# Patient Record
(demographics unavailable — no encounter records)

---

## 2017-12-26 NOTE — ONCOLOGY FOLLOW UP NOTE
EVENT DATE: December 22, 2017



DIAGNOSES

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 
thrombocytopenic purpura.



CHIEF COMPLAINT

Patient is here today for followup of her thrombocytopenia with pregnancy.



HEMATOLOGY HISTORY

Patient is a 28-year-old female who is currently pregnancy in her 15th week of 
gestation.  Patient moved from [place] to [place], [place], and she got 
pregnant and [facility], where she did have blood count on 
September 29, 2017, which showed normal white count of 5440, normal hemoglobin 
at 14.6, and hematocrit at 41.7%, but her platelet count was 84,000.  Repeat 
CBC for the platelets done on October 2, 2017 showed platelet count of 107,000.
  Repeat CBC showed white count 6.8, hemoglobin 13.8, hematocrit 39.7 and the 
platelet count 161,000.  Haptoglobin was normal at 52, total bilirubin was 
normal at 1.2, LDH was normal at 405, ruling out the possibility of hemolytic 
anemia.  B12 level was normal at 470 and the methylmalonic acid assay was 
normal at 0.16.  Serum folate was more than 22.3 and red cell folate was normal 
at 1241.  Platelet associated antibodies came back strongly positive for 
platelet antibodies direct IgG.



HISTORY OF PRESENT ILLNESS

The patient is here today for followup of her thrombocytopenia with pregnancy.  
She is doing fine currently.  She has some nausea and vomiting related to her 
pregnancy.  She has also some cough with expectoration and shortness of breath.
  She is weak, tired and fatigued, but generally, she is stable.  



PAST MEDICAL HISTORY

Insignificant except for premature uterine contractions with her last pregnancy 
9 months ago.



PAST SURGICAL HISTORY

1.  D&C for a miscarriage.

2.  [place] tooth extraction.



FAMILY HISTORY

Mother had breast cancer, maternal grandmother had stomach and lung cancer.



SOCIAL HISTORY

Patient is  with 2 biological children.  She is a homemaker currently.  
She quit working after she got pregnant.  She smoked for 5 years around half a 
pack a day, but she quit 4 years ago.  Denies any abuse of alcohol or illicit 
drugs.



CURRENT MEDICATIONS

1.  Prenatal vitamins.

2.  Folic acid.

3.  Fish oil.



ALLERGIES

No known drug allergies.



REVIEW OF SYSTEMS

CONSTITUTIONAL:  She has some chills but because of the cold weather.  

HEENT:

Ears:  No tinnitus or hearing problem.

Nose:  She has epistaxis.

Throat:  No sore throat or mouth ulcers.

Eyes:  No diplopia or visual changes.

RESPIRATORY:  She has cough with shortness of breath.

CARDIOVASCULAR:  No chest pain, orthopnea, or paroxysmal nocturnal dyspnea (PND)
.  No edema.  No palpitations.

GASTROINTESTINAL:  She has occasional nausea and vomiting.  

GENITOURINARY:  No hematuria or dysuria.

MUSCULOSKELETAL:  No pain in the muscles, joints or bones.

NEUROLOGICAL:  She has occasional headache.

HEMATOLOGIC/LYMPHATIC:  She is weak, tired, and fatigued.  

SKIN:  No skin rash or lumps.

PSYCHIATRIC:  No anxiety or depression.



PHYSICAL EXAMINATION

GENERAL:  Looks stable.  Well-developed, well-nourished, and in no acute 
distress.

VITAL SIGNS:  Blood pressure 115/72, pulse 112 per minute, respirations 16 per 
minute, temperature 96.6, pulse ox 92% on room air.

HEENT:

Head:  Atraumatic.  No sinus tenderness to palpation.

Eyes:  No icterus or conjunctivitis.

Mouth and throat:  No oral thrush or mucositis.

NECK:  Supple.  No cervical or supraclavicular lymphadenopathy.

LUNGS:  Clear to auscultation and percussion bilaterally.

HEART:  Regular rate and rhythm.  No gallops, murmurs, clicks or rubs.

ABDOMEN:  Soft and lax.  No tenderness.  No hepatosplenomegaly.  No masses.

EXTREMITIES:  No cyanosis, clubbing or edema.

LYMPHATICS:  No peripheral lymphadenopathy.

NEUROLOGICAL:  Conscious, alert and oriented times three.  No focal motor or 
sensory deficits.

PSYCHIATRIC:  Mood and affect appear normal.

SKIN:  No skin rash, bruise or purpuric eruption.



DIAGNOSTIC DATA

CBC showed white count 8.4, hemoglobin 13, hematocrit 37.8, platelets 162,000.  



ASSESSMENT

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 
thrombocytopenic purpura given that her thrombocytopenia occurs during her late 
first trimester or the beginning of the second trimester.  Platelet associated 
antibodies were strongly positive for platelet antibodies, direct IgG, B12, 
folate, red cell folate, methylmalonic acid assay, all came back within the 
normal range.  There is no evidence of hemolysis by normal haptoglobin.  Her LDH
, direct bilirubin all came back within the normal range ruling out the 
possibility of microangiopathy with pregnancy.  Her current platelet count is 
162,000, which is normal and stable, and no further workup or intervention is 
required.  Will continue to monitor her CBC every month and I will see her 
again in 3 months with CBC at that time.  



PLAN

1.  Continue followup.

2.  CBC to be checked monthly.

3.  Patient to return in 3 months with CBC.

4.  The patient to contact us for any new concerns or complaints. 
NED

## 2018-02-16 NOTE — OB/GYN PROGRESS NOTE
OB Subjective


Progress Notes


Subjective


Patient reports feels pain improved with ice pack to low back and low abdomen.  

No vaginal bleeding, no contractions.  Good fetal movement.


GI:  NEG Nausea, NEG Vomiting, NEG Flatus, NEG Bowel Movement


:  Voiding Well


Pain:  Mild, Not Requiring Pain Meds


Eyes:  No Visual Disturbances





OB Objective


Physical Exam





Vital Signs








  Date Time  Temp Pulse Resp B/P (MAP) Pulse Ox O2 Delivery O2 Flow Rate FiO2


 


18 19:04 98.2 102 18 107/57 (74) 95 Room Air  








General Appearance:  Alert/Awake/No Acute Distress


Neurological:  No Gross deficits


Eyes:  Normal Extraocular Movement & Vison


Respiratory:  Clear to Auscultation


Abdomen:  Soft, Non-Tender, Non-Distended (fundus non-tender, some diffuse 

tenderness)


Extremities:  No Cyanosis,Clubbing or Edema


Integumentary:  Skin Intact without Lesions or Rash


Psychological:  Alert & Oriented X3, Appropriate Mood & Affect


Result Diagram:  


18 1940





fibrinogen 325


Imaging


OB ultrasound:


EFW 1994g, 9%ile


JOSE LUIS normal at 18.4


anterior fundal placenta, no e/o acute abruption





Assessment and Plan


Problems:  


(1) Abdominal pain affecting pregnancy, antepartum


Status:  Acute


Assessment & Plan:  29yo  at 34.1 with known IUGR at 7%ile, and known 

suspected chronic abruption, now with abdominal pain s/p fall.  Fetal testing 

remains reassuring on NST, no contractile activity, no uterine tenderness.  CBC 

overall good, platelets low but stable at 145 (152 12/12).  Fibrinogen 325.  

Ultrasound shows stable fetal growth, reassuring JOSE LUIS 18, no e/o acute 

abruption.  Pain improved with ice packs to area.  Will allow discharge home 

with trial of cyclobenzaprine, as mechanism of injury was falling backward with 

an acute pulling sensation.  Continue intermittent ice to area.  Strict 

instructions to patient to call us with contractions or any vaginal bleeding or 

decreased fetal movement.





(2) Status post fall


Status:  Acute











VINH GODFREY MD 2018 21:40

## 2018-02-16 NOTE — RADIOLOGY IMAGING REPORT
FACILITY: [facility] 

 

PATIENT NAME: Renate Wilson

: 1989

MR: [id number]

V: [id number]

EXAM DATE: 

ORDERING PHYSICIAN: VINH GODFREY

TECHNOLOGIST: 

 

Location: [facility]

Patient: Renate Wilson

: 1989

MRN: [id number]

Visit/Account:[id number]

Date of Sevice:  2018

 

ACCESSION #: [id number]

 

OB LIMITED

 

HISTORY: Patient fell on her back. Pain.

 

COMPARISON STUDIES:  None.

 

FINDINGS:

Intrauterine gestations: One.

Fetal presentation: Cephalic.

Fetal heart rate: Regular at 142-149 bpm.

Amniotic fluid index: 18.4 cm.

Largest amniotic fluid pocket 6.2 cm.

 

Placenta: Anterior without previa. Placental cord insertion is normal. The sonographer was worried ab
out a small hypoechoic area visible only in the transverse plane deep to the right side of the placen
ta (images 26 through 29). It measures 1.3 x 0.8 cm. The appearance favors that it is a portion of th
e uterine wall. A tiny chronic placental abruption is felt to be much less likely. No ultrasound evid
ence for acute abruption.

Uterus: Gravid, otherwise normal.

Maternal adnexa: Unremarkable.

Cervix: Not visible due to shadowing from the fetal head.

 

Gestational Parameters:

BPD: 8.7 cm 35 weeks/ 1 days

HC: 32.4 cm 36  weeks/ 5 days

AC: 28.2 cm 32  weeks/ 2 days

FL: 6.0 cm 31  weeks/ 1 days

Average ultrasound age (AUA): 33 weeks 6 days

Estimated gestational age by LMP of 2017: 34 weeks 1 days, corresponding to CAN 3/29/2018.

Estimated fetal weight (EFW): 1994 grams +/- 291 grams

EFW based on LMP: 9 th percentile

 

Anatomic Survey:

Dedicated anatomic survey was performed due to the indication of trauma. Visible intracranial structu
res are normal.

 

IMPRESSION:

1. Single live intrauterine gestation; estimated ultrasound age 33 weeks 6 days, corresponding to CAN
 3/31/2018, 2 days

CAN based on LMP.

2. Estimated fetal weight is at the 9th percentile based on LMP.

3. No ultrasound evidence for placental abruption.

 

These findings were discussed by phone with VINH GODFREY on 2018 9:14 PM.

 

Report Dictated By: Anjali Benito at 2018 8:50 PM

 

Report E-Signed By: Anjali Benito  at 2018 9:16 PM

 

WSN:[id number]

## 2018-02-16 NOTE — OB/GYN DISCHARGE SUMMARY
Discharge Summary


Reason for Hosp/Final Diag:  


(1) Abdominal pain affecting pregnancy, antepartum


Status:  Acute


Hospital Course & Plan:  27yo  at 34.1 with known IUGR at 7%ile, and 

known suspected chronic abruption, now with abdominal pain s/p fall.  Fetal 

testing remains reassuring on NST, no contractile activity, no uterine 

tenderness.  CBC overall good, platelets low but stable at 145 (152 12/12).  

Fibrinogen 325.  Ultrasound shows stable fetal growth, reassuring JOSE LUIS 18, no e/

o acute abruption.  Pain improved with ice packs to area.  Will allow discharge 

home with trial of cyclobenzaprine, as mechanism of injury was falling backward 

with an acute pulling sensation.  Continue intermittent ice to area.  Strict 

instructions to patient to call us with contractions or any vaginal bleeding or 

decreased fetal movement.





(2) Status post fall


Status:  Acute


Lates Vital Signs





Vital Signs








  Date Time  Temp Pulse Resp B/P (MAP) Pulse Ox O2 Delivery O2 Flow Rate FiO2


 


18 19:04 98.2 102 18 107/57 (74) 95 Room Air  








Weight (Pounds):  160


Result Diagram:  


18





Condition:  Improved


Discharge:  Home


Home Meds


Active Scripts


Cyclobenzaprine Hcl (CYCLOBENZAPRINE HCL) 10 Mg Tablet, 1 EACH PO Q8H Y for 

MUSCLE SPASMS MDD 30 for 5 Days, #15 TAB


   Prov:VINH GODFREY MD         18


Reported Medications


Acetaminophen (TYLENOL) 325 Mg Tablet, 325 MG PO, TAB


   18


Calcium Carbonate (TUMS) 200 Mg Tab.chew, 200 MG PO, TAB.CHEW


   18


Prenatal Vit #108/Iron/Fa (PRENATAL ONE TABLET) 1 Each Tablet, 1 EACH PO DAILY


   10/6/17


Discontinued Reported Medications


Omega-3/Dha/Epa/Fish Oil (Fish Oil 500 mg Softgel) 60 Mg-90 Mg-500 Mg Capsule, 

500 MG PO DAILY


   10/6/17


[facility] with:  [facility] [phone]


Follow up in:  3-4 days


Discharge Diet:  As Tolerates


Discharge Activity:  As Tolerates











VINH GODFREY MD 2018 21:43

## 2018-02-16 NOTE — HISTORY & PHYSICAL
History of Present Illness


Age of Patient:  28


:  3


Para or TPAL:  


EDC per LMP:  Mar 29, 2018


EDC per U/S:  Mar 29, 2019


Estimated Gestational Age:  34.1


Chief Complaint


abdominal pain after fall on buttocks


History of Present Illness


HPI:  29yo  at 34.1, CAN 3/29/18, who presents for evaluation of 

abdominal pain after fall at 10am this morning.  Patient reports she tripped 

backward over her dog and fell backward onto her buttocks.  She denies intimate 

partner violence.   She reports normal fetal movement, no LOF or PVB.  She 

reports dull pain over her entire abdomen.  Pain started immediately, and 

lasted until about noon.  She reports it felt like she had pulled a muscle.  

She reports that the pain resolved completely, but then returned this evening 

about 5 and has not resolved.  





Of note, she reports that she has been followed with weekly NSTs for IUGR, EFW 7

% and stable, repeat sonogram for growth in 2 weeks scheduled.  Also has had 

bleeding throughout her pregnancy, thought to be likely abruption as no other 

source of bleeding identified.  She reports she is stable from that standpoint.

  She states she has had  contractions with this pregnancy, no  

labor, no medications.





History


Group B Strep Screen:  Unknown


Obstetrical History:


FT  x 2, pt reports  contractions with both pregnancies but term 

deliveries


first trimester spAB


Past Medical History:


mild intermittent asthma


Allergies:  


Coded Allergies:  


     No Known Drug Allergies (Unverified , 18)


Social History:  


, denies intimate partner violence, has one step child.





Med Rec


Home Meds


Reported Medications


Acetaminophen (TYLENOL) 325 Mg Tablet, 325 MG PO, TAB


   18


Calcium Carbonate (TUMS) 200 Mg Tab.chew, 200 MG PO, TAB.CHEW


   18


Prenatal Vit #108/Iron/Fa (PRENATAL ONE TABLET) 1 Each Tablet, 1 EACH PO DAILY


   10/6/17


Discontinued Reported Medications


Omega-3/Dha/Epa/Fish Oil (Fish Oil 500 mg Softgel) 60 Mg-90 Mg-500 Mg Capsule, 

500 MG PO DAILY


   10/6/17





Review of Systems


Constitutional:  No Fever, No Weight Loss, No Weight Gain, No Chills, No Night 

Sweats, No Other


Eyes:  No Vision Change, No Loss of Vision, No Photophobia, No Other


ENT:  No Hearing Loss, No Sinus Congestion, No Sore Throat, No Ear Ache, No 

Tinnitus, No Other


Cardiovascular:  No Chest Pain, No Palpitations, No Orthostatic Hypotension, No 

Other


Respiratory:  No Shortness of Breath, No Cough, No Wheezing, No Other


Gastrointestinal:  Abdominal Pain


Genitourinary:  No Dysuria, No Hematuria, No Urinary Incontinence, No Other


Musculoskeletal:  No Pain, No Sprain, No Strain, No Impaired Mobility, No Other


Psychiatric:  No Depression, No Anxiety, No Other





Exam


General Exam


Vital Signs





Vital Signs








  Date Time  Temp Pulse Resp B/P (MAP) Pulse Ox O2 Delivery O2 Flow Rate FiO2


 


18 19:04 98.2 102 18 107/57 (74) 95 Room Air  








General Apperance:  Alert/Awake/No Acute Distress


Neuro:  No Gross deficits


Cardiovascular:  Regular Rate and Rhythm


Respiratory:  Clear to Auscultation


Abdomen:  Gravid - Non-Tender (soft, non-distended, general abdominal tenderness

, not fundal tenderness)


:  Normal


Extremities:  No Cyanosis,Clubbing or Edema


Integumentary:  Skin Intact without Lesions or Rash


Psychological:  Alert & Oriented X3, Appropriate Mood & Affect





Pregnancy


Uterine Contractions(Q min):  0


Uterine Contraction Strength:  Mild


UC Resting Tone:  Soft





Fetus


Feeling Fetal Movement?:  Yes


Fetal Heart Tones:  120


Fetal Heart Tone Variabilty:  Moderate


FHT Accelerations:  15X15


FHT Decelerations:  None


FHT Category:  I





Medical Decision Making


Pre-Admit Course


Medical Record Review:  No (not available)





VTE Prophylasis: Adult


Deep Vein Thrombosis/Pulmonary:  No


Pharmacological Contraindicati:  Pt at Low Risk for VTE


Mechanical Contraindications:  Pt at Low Risk for VTE





Assessment and Plan


Problems:  


(1) Abdominal pain affecting pregnancy, antepartum


Status:  Acute


Assessment & Plan:  29yo  at 34.1 with known IUGR at 7%ile, and known 

suspected chronic abruption, now with abdominal pain s/p fall.  Fetal testing 

reassuring on NST, no contractile activity, no uterine tenderness.  However, 

recommend fetal ultrasound to evaluate placenta, JOSE LUIS, as well as CBC, fibrinogen

, and K-B.  Rationale for additional testing discussed with patient and all 

questions answered.  CEFM/TOCO monitoring for now.





(2) Status post fall











VINH GODFREY MD 2018 19:52

## 2018-03-15 NOTE — ONCOLOGY FOLLOW UP NOTE
EVENT DATE:  March 15, 2018 



DIAGNOSES

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 
thrombocytopenic purpura.



CHIEF COMPLAINT

Patient is here today for followup of her thrombocytopenia with pregnancy.



HEMATOLOGY HISTORY

Patient is a 28-year-old female who is currently pregnancy in her 15th week of 
gestation.  Patient moved from [place] to [place], [place], and she got 
pregnant and [facility], where she did have blood count on 
September 29, 2017, which showed normal white count of 5440, normal hemoglobin 
at 14.6, and hematocrit at 41.7%, but her platelet count was 84,000.  Repeat 
CBC for the platelets done on October 2, 2017 showed platelet count of 107,000.
  Repeat CBC showed white count 6.8, hemoglobin 13.8, hematocrit 39.7 and the 
platelet count 161,000.  Haptoglobin was normal at 52, total bilirubin was 
normal at 1.2, LDH was normal at 405, ruling out the possibility of hemolytic 
anemia.  B12 level was normal at 470 and the methylmalonic acid assay was 
normal at 0.16.  Serum folate was more than 22.3 and red cell folate was normal 
at 1241.  Platelet associated antibodies came back strongly positive for 
platelet antibodies direct IgG.



HISTORY OF PRESENT ILLNESS

The patient is here today for followup of her thrombocytopenia with pregnancy.  
She is complaining of some runny nose and episodic bloody nose.  She has some 
cough and shortness of breath.  She has also nausea.  She has some pain in her 
hips.  She has occasional headache, and she is weak, tired and fatigued.  As 
per patient, she has two weeks to deliver her baby.  



PAST MEDICAL HISTORY

Insignificant except for premature uterine contractions with her last pregnancy 
9 months ago.



PAST SURGICAL HISTORY

1.  D&C for a miscarriage.

2.  [place] tooth extraction.



FAMILY HISTORY

Mother had breast cancer, maternal grandmother had stomach and lung cancer.



SOCIAL HISTORY

Patient is  with 2 biological children.  She is a homemaker currently.  
She quit working after she got pregnant.  She smoked for 5 years around half a 
pack a day, but she quit 4 years ago.  Denies any abuse of alcohol or illicit 
drugs.



CURRENT MEDICATIONS

1.  Prenatal vitamins.

2.  Folic acid.

3.  Fish oil.



ALLERGIES

No known drug allergies.



REVIEW OF SYSTEMS

CONSTITUTIONAL:  She has some chills but because of the cold weather.  

HEENT:

Ears:  No tinnitus or hearing problem.

Nose:  She has nasal discharge and epistaxis.

Throat:  No sore throat or mouth ulcers.

Eyes:  No diplopia or visual changes.

RESPIRATORY:  She has cough and shortness of breath.

CARDIOVASCULAR:  No chest pain, orthopnea, or paroxysmal nocturnal dyspnea (PND)
.  No edema.  No palpitations.

GASTROINTESTINAL:  She has nausea.  No vomiting.  

GENITOURINARY:  No hematuria or dysuria.

MUSCULOSKELETAL:  She has bilateral pain in the hips.  No pain in the muscles, 
joints or bones.

NEUROLOGICAL:  She has occasional headache.

HEMATOLOGIC/LYMPHATIC:  She is weak, tired, and fatigued.  

SKIN:  No skin rash or lumps.

PSYCHIATRIC:  No anxiety or depression.



PHYSICAL EXAMINATION

GENERAL:  Looks stable.  Well-developed, well-nourished, and in no acute 
distress.

VITAL SIGNS:  Blood pressure 117/74, pulse 101 per minute, respirations 16 per 
minute, temperature 96.8, pulse ox 97% on room air.

HEENT:

Head:  Atraumatic.  No sinus tenderness to palpation.

Eyes:  No icterus or conjunctivitis.

Mouth and throat:  No oral thrush or mucositis.

NECK:  Supple.  No cervical or supraclavicular lymphadenopathy.

LUNGS:  Clear to auscultation and percussion bilaterally.

HEART:  Regular rate and rhythm.  No gallops, murmurs, clicks or rubs.

ABDOMEN:  Distended because of her pregnancy.  Soft and lax.  No tenderness.  
No hepatosplenomegaly.  No masses.

EXTREMITIES:  No cyanosis, clubbing or edema.

LYMPHATICS:  No peripheral lymphadenopathy.

NEUROLOGICAL:  Conscious, alert and oriented times three.  No focal motor or 
sensory deficits.

PSYCHIATRIC:  Mood and affect appear normal.

SKIN:  No skin rash, bruise or purpuric eruption.



DIAGNOSTIC DATA

CBC showed white count 7.8, hemoglobin 12.1, hematocrit 34.9, platelets 168,
000.  



ASSESSMENT

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 
thrombocytopenic purpura given that her thrombocytopenia occurs during her late 
first trimester or the beginning of the second trimester.  Platelet associated 
antibodies were strongly positive for platelet antibodies, direct IgG.  B12, 
folate, red cell folate, methylmalonic acid assay, all came back within the 
normal range.  Her platelet count currently is normal at 168,000.  There is no 
hematological intervention required during her delivery unless her count is 
low.  I explained that to the patient.  I am planning to see her again in six 
months with CBC at that time and I will advise the patient to contact us if she 
has any excessive bruising or bleeding, or if her count is low when it will be 
checked prior to her delivery.  



PLAN

1.  Continue followup.

2.  Patient to return in six months with CBC.

3.  The patient to contact us for any new concerns or complaints. 
NED

## 2018-04-06 NOTE — HISTORY & PHYSICAL
History of Present Illness


Age of Patient:  25


:  4


Para or TPAL:  2


EDC per LMP:  Mar 29, 2018


EDC per U/S:  Mar 31, 2018


Estimated Gestational Age:  41.1


Chief Complaint


Induction of labor


History of Present Illness


Pt is a 27 y/o  @ 41-1/7 weeks gestation by lmp c/w early sonogram who 

presents to L&D for a scheduled IOL.  Pt reports good fetal movement.  No 

vaginal bleeding.  No loss of amniotic fluid.  No headache, RUQ, or visual 

changes.





History


Patient's Blood Type:  O Positive


Rubella Status:  Immune


Group B Strep Screen:  Negative


Obstetrical History:





 X1


SAB X 1


Past Medical History:


Asthma: Inhaler (albuterol)prn


Allergies:  


Coded Allergies:  


     No Known Drug Allergies (Unverified , 18)


Social History:  


, denies intimate partner violence, has one step child.





Med Rec


Home Meds


Reported Medications


Albuterol Sulfate (PROVENTIL HFA) 6.7 Gm Inh, 2 PUFF INH Q4-6H, INH


   18


Calcium Carbonate (TUMS) 200 Mg Tab.chew, 200 MG PO, TAB.CHEW


   18


Prenatal Vit #108/Iron/Fa (PRENATAL ONE TABLET) 1 Each Tablet, 1 EACH PO DAILY


   10/6/17


Discontinued Reported Medications


Acetaminophen (TYLENOL) 325 Mg Tablet, 325 MG PO, TAB


   18





Review of Systems


All Systems Reviewed/Normal:  Yes, Except as Noted


Constitutional:  No Fever, No Weight Loss, No Weight Gain, No Chills, No Night 

Sweats, No Other


Neurological:  No Syncope, No Confusion, No Weakness, No Dizziness, No Slurred 

Speech, No Other


Eyes:  No Vision Change, No Loss of Vision, No Photophobia, No Other


ENT:  No Hearing Loss, No Sinus Congestion, No Sore Throat, No Ear Ache, No 

Tinnitus, No Other


Cardiovascular:  No Chest Pain, No Palpitations, No Orthostatic Hypotension, No 

Other


Respiratory:  No Shortness of Breath, No Cough, No Wheezing, No Other


Gastrointestinal:  No Nausea, No Vomiting, No Diarrhea, No Dysphagia, No 

Constipation, No Early Satiety, No Hematemesis, No Hematochezia, No Melena, No 

Abdominal Pain, No Other


Genitourinary:  No Dysuria, No Hematuria, No Urinary Incontinence, No Other


Musculoskeletal:  No Pain, No Sprain, No Strain, No Impaired Mobility, No Other


Psychiatric:  No Depression, No Anxiety, No Other





Exam


General Exam


Vital Signs





Vital Signs








  Date Time  Temp Pulse Resp B/P (MAP) Pulse Ox O2 Delivery O2 Flow Rate FiO2


 


18 06:30 98.3 87 18 111/69 (83) 94 Room Air  








General Apperance:  Alert/Awake/No Acute Distress


Neuro:  No Gross deficits


Eyes:  PERRLA


ENT:  Normal


Cardiovascular:  Regular Rate and Rhythm


Respiratory:  No Respiratory Distress, Clear to Auscultation


Abdomen:  Soft, Non-Tender, Non-Distended, Gravid - Non-Tender


:  Normal


Musculoskeletal:  No Weakness/Pain


Psychological:  Alert & Oriented X3, Appropriate Mood & Affect





Pregnancy


Vaginal Discharge/Fluid?:  Clear Fluid (with amniotomy)


Cervical Dialation:  3


Cervical Effacement (%):  80


Cervical Consistency:  Soft


Cervical Position:  Anterior


Fetal Station:  -2


Fetal Presentation:  Vertex


Uterine Contractions(Q min):  3


Uterine Contraction Strength:  Moderate


UC Resting Tone:  Soft





Fetus


Feeling Fetal Movement?:  Yes


Estimated Fetal Weight(grams):  3000


Fetal Heart Tones:  120


Fetal Heart Tone Variabilty:  Moderate


FHT Accelerations:  15X15


FHT Decelerations:  None


FHT Category:  I





Medical Decision Making


Data Points


Result Diagram:  


18 0600








Pre-Admit Course


Medical Record Review:  Yes





VTE Prophylasis: Adult


Deep Vein Thrombosis/Pulmonary:  No





Assessment and Plan


OB/GYN Assessment:  Stable


OB/GYN Plan:  Routine Labor/Induct Care


Problems:  


(1) 41 weeks gestation of pregnancy


Assessment & Plan:  Oxytocin currently on.  S/P amniotomy.  Pt declines need 

for epidural and desires to use IV pain medications.  Expect .  














CINDY GONZALEZ DO 2018 08:48

## 2018-04-06 NOTE — ANESTHESIA OB PRE-ANES EVAL
History of Present Illness


Anesthesia Start Date:  2018


Anesthesia Start Time:  12:35


OB Anesthesia Diagnosis:  induction - elective


EDC:  Mar 29, 2018


:  4


Para:  2


Vital Signs:





Vital Signs








 18





 06:30


 


Temp 98.3


 


Pulse 87


 


Resp 18


 


B/P (MAP) 111/69 (83)


 


Pulse Ox 94


 


O2 Delivery Room Air








Pain Ratin


Result Diagram:  


18 0600





Height (Inches):  65.00


Weight (Pounds):  170


BMI Calculated:  28.29





Past Medical History


Medical History:  asthma


Surgical History:  other


Previous Anesthesia:  general, epidural


Attended Childbirth Classes?:  No


Hx Anesthesia Reactions:  No


Hx Family Anesthesia Reaction:  No


Current Medications:  pain medication


Home Meds


Reported Medications


Albuterol Sulfate (PROVENTIL HFA) 6.7 Gm Inh, 2 PUFF INH Q4-6H, INH


   18


Calcium Carbonate (TUMS) 200 Mg Tab.chew, 200 MG PO, TAB.CHEW


   18


Prenatal Vit #108/Iron/Fa (PRENATAL ONE TABLET) 1 Each Tablet, 1 EACH PO DAILY


   10/6/17


Discontinued Reported Medications


Acetaminophen (TYLENOL) 325 Mg Tablet, 325 MG PO, TAB


   18


Allergies:  


Coded Allergies:  


     No Known Drug Allergies (Unverified , 18)





Anesthesia OB ROS


Neurological:  No migraines/headaches, No seizures, No neuropathy, No other


ENT:  Denies Tooth caps, Denies Loose teeth, Denies Chipped teeth, Denies 

Dentures, Denies Bridges, Denies Retainers, Denies Veneers, Denies Implants, 

Denies Tongue ring, Denies Other


Airway Class:  ll


Cardiovascular ROS:  No edema, No arrhythmia, No other


 ROS:  No Herpes, No STD(s), No Liver Disease, No Renal Disease, No Other


Endocrine ROS:  No diabetes, No gestational diabetes, No thyroid disorder, No 

other


Musculoskeletal ROS:  No low back pain, No low back injury, No scoliosis, No 

other


ASA Classification:  2





Assessment and Plan


Anesthesia Plan:  CSE


Anesthesia Stop Day:  2018


Anesthesia Stop Time:  14:00











KEYSHAWN KULKARNI CRNA 2018 13:45

## 2018-04-06 NOTE — DELIVERY NOTE
DELIVERY DATE:  2018 

SURGEON:  Javad Orlando DO

ANESTHESIA: Epidural.  





PREOPERATIVE DIAGNOSES 

1.  A 28-year-old  4 para 2 at 41-1/7 weeks gestation.

2.  Induction of labor. 



POSTOPERATIVE DIAGNOSES 

1.  A 28-year-old  4 para 2 at 41-1/7 weeks gestation.

2.  Induction of labor. 

3.  Delivered.  



PROCEDURE 

Spontaneous vaginal delivery with no laceration repair.  



FINDINGS 

Live born male infant at 1341 hours, Apgars of 9 and 9, weighing 3312 g, 7 
pounds 4.8 ounces.  Three-vessel cord, intact placenta over an intact perineum.
  



ESTIMATED BLOOD LOSS 

400 mL 



PATHOLOGY 

None.  



COMPLICATIONS 

None known.  



CONDITION 

Stable times two.  Mother and infant remained in the LDRP.  



COUNTS 

Correct times two for all needles, laps, sponges and instruments.  



LABOR SUMMARY 

Patient is a 28-year-old  4, para 2 at 41-1/7 weeks gestation by LMP 
consistent with an early sonogram.  Patient was admitted to Labor and Delivery 
for induction of labor.  She started with a cervix at 3 cm.  She was given 
oxytocin for induction of labor.  She underwent amniotomy with clear amniotic 
fluid.  She made slow progress initially with the oxytocin, but eventually got 
to 5 cm.  Did request an epidural for pain control.  After epidural patient 
began to have some variable decelerations and quickly made it to complete and +
2 station with the urge to push.  At this time the delivery time was called and 
assembled.



DELIVERY SUMMARY 

Patient was placed in the dorsal lithotomy position, prepped and draped in the 
usual sterile manner.  Upon maternal pushing the infant's head delivered in a 
controlled manner followed by the anterior shoulder with gentle downward motion
, the posterior shoulder with gentle upward motion.  The remainder of the infant
's body delivered spontaneously.  Mouth and nose were bulb suctioned.  The cord 
was clamped times two and cut by the infant's father, at which time the infant 
was allowed to remain on the maternal abdomen where nursing staff was attending 
to infant.  



At this point cord blood gases were obtained and the placenta delivered 
spontaneously with gentle contraction.  Oxytocin was infused to help with 
uterine tone.  The uterus was massaged and deemed firm.  



Next, upon inspection of perineum, vagina, cervix and labia it was noted that 
there were no lacerations present.  At this point the patient was cleaned, the 
labor bed was reassembled and mother and infant were allowed to continue to 
bond.  

[facility]

## 2018-04-06 NOTE — PROCEDURE NOTE
Anesthetic Placement Note


Anesthesia Plan:  CSE


Anesthesia Technique:  Patient Sitting


Interspace:  L 3-4


Local Anesthetic:  1% Lidocaine


Amount Local - cc's:  3


Anesthesia Needle:  17g Touhy/Schliff


Anesthesia Attempts:  1


Loss of Resistance:  Normal Saline


Depth of ZEENAT (cm):  5


Epidural Needle Placement:  No CSF, No Blood, No Parasthesia


Intrathecal Needle:  27 Gauge Pencan


Cerebral Spinal Fluid:  Yes, Clear


Catheter Insertion (cm):  9


Catheter Type:  Castro - Spring Wound


Epidural Dressing:  Tegaderm, Tape


Anesthesia Tray:  Lot Number ([id number]), Expiration Date (10/18), Reference 

Number ([id number])





Anesthesia Medications:


Intrathecal Dose:  mcg Fentanyl (10), mg Spinal Bupivicaine (2.5)


Epidural Test Dose:  1.5 Lido/Epi (1:200,000), Dose - mL (3), Time (1250), 

Negative


Epidural Infusion:  0.2% Ropivicaine, With Fentanyl 2mcg/ml, Start Time: (1303)


Epidural Pump Setting:  Bolus Dose - mL (6), Lockout - Minutes (20), 

Maintenance Rate - mL/hr (6), Maximum per Hour - mL (24)


Complications:  None











KEYSHAWN KULKARNI CRNA Apr 6, 2018 13:47

## 2018-04-06 NOTE — OB DELIVERY NOTE
Delivery Note


Vaginal Delivery Type:  Spont. Vaginal Delivery


Delivery Date:  2018


Delivery Time:  13:41


Estimated Gestational Age(wks):  41.1


Length of Labor Stage I (hrs):  5.5


Length of Labor Stage II (hrs):  0.2


Labor Stage III (minutes):  5


Delivery Anesthesia:  Epidural


Infant Sex:  Male


Infant Weight (gms):  3312 (7#4.8oz)


[place] Apgars:  1 Minute (9), 5 Minute (9)


Estimated Blood Loss:  400


Pediatrician in Attendence:  CINDY Yeung DO 2018 17:25

## 2018-04-07 NOTE — ANESTHESIA POST EVAL NOTE
Anesthesia Post Eval Note





Vital Signs








  Date Time  Temp Pulse Resp B/P (MAP) Pulse Ox O2 Delivery O2 Flow Rate FiO2


 


4/7/18 08:42 98.6 81 16 117/64 (81)    


 


4/7/18 04:23     94 Room Air  








Pt able to participate in Eval:  Yes


Cardiovascular Status:  Satisfactory


Respiratory Status:  Satisfactory


Pain Managment:  Satisfactory


PO Nausea/Vomiting:  Satisfactory


Temperature Management:  Satisfactory


Mental Status:  Satisfactory, Alert, Oriented X3


Post-Op Hydration Status:  Satisfactory, Tolerating PO Well, Voiding w/o 

Difficulty


Anesthesia Tolerance:


Tolerated procedure well without apparent anesthetic complications. LP site 

clear, no redness or edema.  Denies headache or any residual paresthesia.  

Vital Signs Stable, Patient comfortable and condition stable.











SAM SHETH CRNA Apr 7, 2018 11:07

## 2018-04-07 NOTE — OB/GYN PROGRESS NOTE
OB Subjective


Progress Notes


Subjective


Doing good this morning.  Tolerating regular diet.  Ambulatory.  Voiding with 

out any issues.  Breastfeeding.  Lochia appropriate.


GI:  NEG Nausea, NEG Vomiting, NEG Flatus, NEG Bowel Movement


:  Voiding Well, Vaginal Bleeding, Scant


Pain:  Mild, Tolerating PO Pain Meds


Neurological:  No Headache, No Other


Eyes:  No Visual Disturbances





OB Objective


Physical Exam





Vital Signs








  Date Time  Temp Pulse Resp B/P (MAP) Pulse Ox O2 Delivery O2 Flow Rate FiO2


 


4/7/18 06:15   15     


 


4/7/18 04:23 97.5 90  121/69 (86) 94 Room Air  








General Appearance:  Alert/Awake/No Acute Distress


Neurological:  No Gross deficits


Eyes:  Normal Extraocular Movement & Vison, PERRLA


ENT:  Normal


Neck:  No Masses


Cardiovascular:  Normal Rhythm & Peripheral Pulses, Regular Rate and Rhythm


Respiratory:  No Respiratory Distress, Clear to Auscultation


:  Normal


Musculoskeletal:  No Weakness/Pain


Extremities:  No Cyanosis,Clubbing or Edema


Integumentary:  Skin Intact without Lesions or Rash


Psychological:  Alert & Oriented X3, Appropriate Mood & Affect


Result Diagram:  


4/7/18 0537








Assessment and Plan


OB/GYN Assessment:  Stable


Problems:  


(1) 41 weeks gestation of pregnancy


Status:  Resolved


Assessment & Plan:  Doing good this morning.  Tolerating regular diet.  Desires 

to be discharged home with infant.  














CINDY GONZALEZ DO Apr 7, 2018 07:27

## 2018-04-07 NOTE — OB/GYN DISCHARGE SUMMARY
Discharge Summary


Reason for Hosp/Final Diag:  


(1) 41 weeks gestation of pregnancy


Status:  Resolved


Hospital Course & Plan:  Pt presented for IOL.  Received oxytocin and 

progressed to complete.  Quickly delivered with out any difficulty.  Pt 

remained in the hospital for 1 day post partum.  Pt desired to be discharged 

home on PPD #1.  





[place] Vital Signs





Vital Signs








  Date Time  Temp Pulse Resp B/P (MAP) Pulse Ox O2 Delivery O2 Flow Rate FiO2


 


4/7/18 06:15   15     


 


4/7/18 04:23 97.5 90  121/69 (86) 94 Room Air  








Weight (Pounds):  170


Result Diagram:  


4/7/18 0537





Condition:  Improved


Discharge:  Home


Home Meds


Active Scripts


Hydrocodone Bit/Acetaminophen (HYDROCODON-ACETAMINOPHEN 5-325) 1 Each Tablet, 1-

2 EACH PO Q4H Y for PAIN, #30 TAB 0 Refills


   Prov:CINDY GONZALEZ DO         4/7/18


Ibuprofen (IBUPROFEN) 800 Mg Tablet, 800 MG PO Q8H@0700,1500,2300, #30 TAB 0 

Refills


   Prov:CINDY GONZALEZ DO         4/7/18


Reported Medications


Albuterol Sulfate (PROVENTIL HFA) 6.7 Gm Inh, 2 PUFF INH Q4-6H, INH


   4/6/18


Calcium Carbonate (TUMS) 200 Mg Tab.chew, 200 MG PO, TAB.CHEW


   2/16/18


Prenatal Vit #108/Iron/Fa (PRENATAL ONE TABLET) 1 Each Tablet, 1 EACH PO DAILY


   10/6/17


Discontinued Reported Medications


Acetaminophen (TYLENOL) 325 Mg Tablet, 325 MG PO, TAB


   2/16/18


[facility]  [facility] [phone], Dr. Gonzalez [phone]


Follow up in:  6 wks PP or PO


Discharge Diet:  As Tolerates, Resume Prior Admit Diet, Increase Fluid Intake


Discharge Activity:  As Tolerates, Pelvic Rest











CINDY GONZALEZ DO Apr 7, 2018 07:31